# Patient Record
Sex: FEMALE | Race: WHITE | Employment: OTHER | URBAN - METROPOLITAN AREA
[De-identification: names, ages, dates, MRNs, and addresses within clinical notes are randomized per-mention and may not be internally consistent; named-entity substitution may affect disease eponyms.]

---

## 2017-02-16 ENCOUNTER — HOSPITAL ENCOUNTER (OUTPATIENT)
Dept: CT IMAGING | Age: 72
Discharge: HOME | End: 2017-02-16
Attending: UROLOGY | Admitting: UROLOGY

## 2017-02-16 DIAGNOSIS — N13.30 HYDRONEPHROSIS, UNSPECIFIED HYDRONEPHROSIS TYPE: ICD-10-CM

## 2017-02-20 ENCOUNTER — HOSPITAL ENCOUNTER (OUTPATIENT)
Dept: GENERAL RADIOLOGY | Age: 72
Discharge: HOME | End: 2017-02-20
Attending: UROLOGY | Admitting: UROLOGY

## 2017-02-20 DIAGNOSIS — N20.0 CALCULUS OF KIDNEY: ICD-10-CM

## 2017-02-20 LAB
ANION GAP SERPL CALCULATED.3IONS-SCNC: 11 MMOL/L (ref 4–16)
BUN BLDV-MCNC: 18 MG/DL (ref 6–23)
CALCIUM SERPL-MCNC: 9.6 MG/DL (ref 8.3–10.6)
CHLORIDE BLD-SCNC: 102 MMOL/L (ref 99–110)
CO2: 29 MMOL/L (ref 21–32)
CREAT SERPL-MCNC: 0.7 MG/DL (ref 0.6–1.1)
GFR AFRICAN AMERICAN: >60 ML/MIN/1.73M2
GFR NON-AFRICAN AMERICAN: >60 ML/MIN/1.73M2
GLUCOSE BLD-MCNC: 139 MG/DL (ref 70–140)
POTASSIUM SERPL-SCNC: 4.2 MMOL/L (ref 3.5–5.1)
SODIUM BLD-SCNC: 142 MMOL/L (ref 135–145)

## 2017-02-24 ENCOUNTER — HOSPITAL ENCOUNTER (OUTPATIENT)
Dept: GENERAL RADIOLOGY | Age: 72
Discharge: HOME | End: 2017-02-24

## 2017-02-24 LAB — HEMOGLOBIN: 13.4 GM/DL (ref 12.5–16)

## 2017-02-26 LAB
EKG ATRIAL RATE: 80 BPM
EKG DIAGNOSIS: NORMAL
EKG P AXIS: 52 DEGREES
EKG P-R INTERVAL: 132 MS
EKG Q-T INTERVAL: 396 MS
EKG QRS DURATION: 94 MS
EKG QTC CALCULATION (BAZETT): 456 MS
EKG R AXIS: 20 DEGREES
EKG T AXIS: 46 DEGREES
EKG VENTRICULAR RATE: 80 BPM

## 2017-03-03 ENCOUNTER — HOSPITAL ENCOUNTER (OUTPATIENT)
Dept: GENERAL RADIOLOGY | Age: 72
Discharge: HOME | End: 2017-03-03
Attending: UROLOGY | Admitting: UROLOGY

## 2017-03-03 DIAGNOSIS — N20.0 CALCULUS OF KIDNEY: ICD-10-CM

## 2018-03-29 ENCOUNTER — HOSPITAL ENCOUNTER (OUTPATIENT)
Dept: GENERAL RADIOLOGY | Age: 73
Discharge: HOME | End: 2018-03-29
Attending: UROLOGY | Admitting: UROLOGY

## 2018-03-29 DIAGNOSIS — N20.1 URETERAL STONE: ICD-10-CM

## 2018-05-17 PROBLEM — N20.0 KIDNEY STONE: Status: ACTIVE | Noted: 2018-05-17

## 2018-05-18 ENCOUNTER — NURSE ONLY (OUTPATIENT)
Dept: CARDIOLOGY CLINIC | Age: 73
End: 2018-05-18

## 2018-05-18 DIAGNOSIS — I49.9 IRREGULAR HEART BEAT: Primary | ICD-10-CM

## 2018-05-18 PROCEDURE — 93225 XTRNL ECG REC<48 HRS REC: CPT | Performed by: INTERNAL MEDICINE

## 2018-05-24 ENCOUNTER — TELEPHONE (OUTPATIENT)
Dept: CARDIOLOGY CLINIC | Age: 73
End: 2018-05-24

## 2018-05-24 PROCEDURE — 93227 XTRNL ECG REC<48 HR R&I: CPT | Performed by: INTERNAL MEDICINE

## 2018-08-08 ENCOUNTER — HOSPITAL ENCOUNTER (OUTPATIENT)
Dept: GENERAL RADIOLOGY | Age: 73
Discharge: HOME | End: 2018-08-08
Attending: UROLOGY | Admitting: UROLOGY

## 2018-08-08 DIAGNOSIS — N20.0 KIDNEY STONE: ICD-10-CM

## 2018-08-23 NOTE — ANESTHESIA PRE-OP
Fatty Acids (FISH OIL) 1200 MG CPDR Take 1,200 mg by mouth daily 05/17/18 Takes OTC    Historical Provider, MD   Cholecalciferol (VITAMIN D PO) Take 1 tablet by mouth daily 05/17/18 Takes OTC    Historical Provider, MD   Ascorbic Acid (VITAMIN C PO) Take 1 tablet by mouth daily 05/17/18 Takes OTC    Historical Provider, MD   CALCIUM CARBONATE PO Take 1 tablet by mouth daily 05/17/18 Takes OTC    Historical Provider, MD   metFORMIN (GLUCOPHAGE) 500 MG tablet Take 500 mg by mouth daily (with breakfast)    Historical Provider, MD   almotriptan (AXERT) 12.5 MG tablet Take 12.5 mg by mouth once as needed for Migraine may repeat in 2 hours if needed    Historical Provider, MD       Current medications:    Current Outpatient Prescriptions   Medication Sig Dispense Refill    Cyanocobalamin (VITAMIN B 12) 100 MCG LOZG Take by mouth daily      vitamin B-6 (PYRIDOXINE) 50 MG tablet Take 100 mg by mouth daily      Biotin 2500 MCG CAPS Take by mouth daily      aspirin 81 MG chewable tablet Take 1 tablet by mouth daily 30 tablet 0    Multiple Vitamins-Minerals (THERAPEUTIC MULTIVITAMIN-MINERALS) tablet Take 1 tablet by mouth daily 05/17/18 Takes OTC      Omega-3 Fatty Acids (FISH OIL) 1200 MG CPDR Take 1,200 mg by mouth daily 05/17/18 Takes OTC      Cholecalciferol (VITAMIN D PO) Take 1 tablet by mouth daily 05/17/18 Takes OTC      Ascorbic Acid (VITAMIN C PO) Take 1 tablet by mouth daily 05/17/18 Takes OTC      CALCIUM CARBONATE PO Take 1 tablet by mouth daily 05/17/18 Takes OTC      metFORMIN (GLUCOPHAGE) 500 MG tablet Take 500 mg by mouth daily (with breakfast)      almotriptan (AXERT) 12.5 MG tablet Take 12.5 mg by mouth once as needed for Migraine may repeat in 2 hours if needed       No current facility-administered medications for this encounter. Allergies:     Allergies   Allergen Reactions    Iodine        Problem List:    Patient Active Problem List   Diagnosis Code    Kidney stone N20.0    Intractable vomiting with nausea R11.2       Past Medical History:        Diagnosis Date    24 holter monitor 05/18/2018    Nothing significant found.  Diabetes mellitus ([address]     pre diabetic    Kidney stone        Past Surgical History:        Procedure Laterality Date    COLONOSCOPY      DILATION AND CURETTAGE OF UTERUS      ENDOSCOPY, COLON, DIAGNOSTIC      EYE SURGERY      LITHOTRIPSY         Social History:    Social History   Substance Use Topics    Smoking status: Never Smoker    Smokeless tobacco: Never Used    Alcohol use Yes      Comment: occas                                Counseling given: Not Answered      Vital Signs (Current): There were no vitals filed for this visit. BP Readings from Last 3 Encounters:   05/18/18 114/66   05/03/18 130/80       NPO Status:                                                                                 BMI:   Wt Readings from Last 3 Encounters:   08/22/18 140 lb (63.5 kg)   05/17/18 140 lb 3.2 oz (63.6 kg)   05/03/18 145 lb (65.8 kg)     There is no height or weight on file to calculate BMI. Anesthesia Evaluation  Patient summary reviewed   history of anesthetic complications: PONV. Airway: Mallampati: II  TM distance: >3 FB   Neck ROM: full  Mouth opening: > = 3 FB Dental:          Pulmonary:Negative Pulmonary ROS                              Cardiovascular:  Exercise tolerance: good (>4 METS),            Beta Blocker:  Not on Beta Blocker      ROS comment: Echo 5/2018:  Kaylee Owen   Left ventricular systolic function is normal.   Ejection fraction is visually estimated at 55-60%.   Moderate concentric left ventricular hypertrophy.   No evidence of any pericardial effusion     Neuro/Psych:   Negative Neuro/Psych ROS              GI/Hepatic/Renal:   (+) bowel prep,           Endo/Other:    (+) DiabetesType II DM, , .                 Abdominal:           Vascular: negative vascular ROS. Anesthesia Plan      general and TIVA     ASA 2       Induction: intravenous. Anesthetic plan and risks discussed with patient. DEE Gleason - CRNA   8/23/2018       Pre Anesthesia Assessment complete.  Chart reviewed on 8/23/2018

## 2018-08-24 ENCOUNTER — HOSPITAL ENCOUNTER (OUTPATIENT)
Dept: SURGERY | Age: 73
Discharge: HOME | End: 2018-08-24
Attending: SPECIALIST | Admitting: SPECIALIST

## 2018-08-24 VITALS
RESPIRATION RATE: 16 BRPM | TEMPERATURE: 97.5 F | HEIGHT: 67 IN | HEART RATE: 63 BPM | OXYGEN SATURATION: 100 % | DIASTOLIC BLOOD PRESSURE: 79 MMHG | WEIGHT: 137 LBS | SYSTOLIC BLOOD PRESSURE: 123 MMHG | BODY MASS INDEX: 21.5 KG/M2

## 2018-08-24 LAB — GLUCOSE BLD-MCNC: 118 MG/DL (ref 70–99)

## 2018-08-24 RX ORDER — SODIUM CHLORIDE, SODIUM LACTATE, POTASSIUM CHLORIDE, CALCIUM CHLORIDE 600; 310; 30; 20 MG/100ML; MG/100ML; MG/100ML; MG/100ML
INJECTION, SOLUTION INTRAVENOUS CONTINUOUS
Status: DISCONTINUED | OUTPATIENT
Start: 2018-08-24 | End: 2018-08-25 | Stop reason: HOSPADM

## 2018-08-24 RX ORDER — PROMETHAZINE HYDROCHLORIDE 25 MG/ML
6.25 INJECTION, SOLUTION INTRAMUSCULAR; INTRAVENOUS
Status: ACTIVE | OUTPATIENT
Start: 2018-08-24 | End: 2018-08-24

## 2018-08-24 RX ADMIN — SODIUM CHLORIDE, SODIUM LACTATE, POTASSIUM CHLORIDE, CALCIUM CHLORIDE: 600; 310; 30; 20 INJECTION, SOLUTION INTRAVENOUS at 09:53

## 2018-08-24 ASSESSMENT — PAIN - FUNCTIONAL ASSESSMENT: PAIN_FUNCTIONAL_ASSESSMENT: 0-10

## 2018-08-24 ASSESSMENT — PAIN SCALES - GENERAL
PAINLEVEL_OUTOF10: 0
PAINLEVEL_OUTOF10: 0

## 2018-08-24 NOTE — PROGRESS NOTES
1102 Patient transferred from GI lab to [facility] via cart, her  is at bedside. Dr Radha Barlow in to talk to patient and her .

## 2018-08-24 NOTE — BRIEF OP NOTE
BRIEF COLONOSCOPY REPORT:    Impression:    1) 6 small polyps removed    2) small internal hemorrhoids        Suggest:   1)The interval for repeat colonoscopy will be determined by the path report. The complete operative report (including photos) is available in the following locations:   1) soft chart now   2) report will also be scanned and can then be found by going to \"chart review\" then \"notes\" then \"op report\" or by going to \"chart review\" then \"media\" then \"op report\". For review of photos, may need to go to page 2.

## 2018-11-12 ENCOUNTER — HOSPITAL ENCOUNTER (OUTPATIENT)
Dept: CT IMAGING | Age: 73
Discharge: HOME | End: 2018-11-12
Payer: MEDICARE

## 2018-11-12 DIAGNOSIS — R91.1 COIN LESION OF LUNG: ICD-10-CM

## 2018-11-12 LAB — POC CREATININE: 0.9 MG/DL (ref 0.6–1.1)

## 2018-11-12 PROCEDURE — 6360000004 HC RX CONTRAST MEDICATION: Performed by: EMERGENCY MEDICINE

## 2018-11-12 PROCEDURE — 71260 CT THORAX DX C+: CPT

## 2018-11-12 PROCEDURE — 2580000003 HC RX 258: Performed by: EMERGENCY MEDICINE

## 2018-11-12 RX ORDER — 0.9 % SODIUM CHLORIDE 0.9 %
10 VIAL (ML) INJECTION PRN
Status: DISCONTINUED | OUTPATIENT
Start: 2018-11-12 | End: 2018-11-13 | Stop reason: HOSPADM

## 2018-11-12 RX ADMIN — IOPAMIDOL 80 ML: 755 INJECTION, SOLUTION INTRAVENOUS at 10:48

## 2018-11-12 RX ADMIN — Medication 10 ML: at 10:48

## 2018-11-26 LAB
AVERAGE GLUCOSE: NORMAL
HBA1C MFR BLD: 5.9 %

## 2018-12-11 ENCOUNTER — APPOINTMENT (OUTPATIENT)
Dept: GENERAL RADIOLOGY | Age: 73
End: 2018-12-11
Payer: MEDICARE

## 2018-12-11 ENCOUNTER — HOSPITAL ENCOUNTER (EMERGENCY)
Age: 73
Discharge: HOME | End: 2018-12-11
Payer: MEDICARE

## 2018-12-11 VITALS
DIASTOLIC BLOOD PRESSURE: 86 MMHG | BODY MASS INDEX: 23.3 KG/M2 | OXYGEN SATURATION: 100 % | RESPIRATION RATE: 15 BRPM | SYSTOLIC BLOOD PRESSURE: 131 MMHG | TEMPERATURE: 98 F | HEART RATE: 85 BPM | HEIGHT: 66 IN | WEIGHT: 145 LBS

## 2018-12-11 DIAGNOSIS — S62.115A CLOSED NONDISPLACED FRACTURE OF TRIQUETRUM OF LEFT WRIST, INITIAL ENCOUNTER: Primary | ICD-10-CM

## 2018-12-11 DIAGNOSIS — S92.344A CLOSED NONDISPLACED FRACTURE OF FOURTH METATARSAL BONE OF RIGHT FOOT, INITIAL ENCOUNTER: ICD-10-CM

## 2018-12-11 PROCEDURE — 4500000028 HC INTERMEDIATE PROCEDURE

## 2018-12-11 PROCEDURE — 73110 X-RAY EXAM OF WRIST: CPT

## 2018-12-11 PROCEDURE — 73630 X-RAY EXAM OF FOOT: CPT

## 2018-12-11 PROCEDURE — 99284 EMERGENCY DEPT VISIT MOD MDM: CPT

## 2018-12-11 ASSESSMENT — PAIN SCALES - GENERAL: PAINLEVEL_OUTOF10: 4

## 2018-12-11 ASSESSMENT — PAIN DESCRIPTION - PAIN TYPE
TYPE: ACUTE PAIN
TYPE: ACUTE PAIN

## 2018-12-11 ASSESSMENT — PAIN DESCRIPTION - DESCRIPTORS: DESCRIPTORS: ACHING

## 2018-12-11 ASSESSMENT — PAIN DESCRIPTION - ORIENTATION
ORIENTATION: LEFT;RIGHT
ORIENTATION: LEFT

## 2018-12-11 ASSESSMENT — PAIN DESCRIPTION - LOCATION
LOCATION: WRIST
LOCATION: WRIST;KNEE

## 2018-12-11 ASSESSMENT — PAIN DESCRIPTION - FREQUENCY: FREQUENCY: CONTINUOUS

## 2018-12-11 NOTE — ED PROVIDER NOTES
hemorrhoids, 3mm polyp in sigmoid colon, 6 mm polyp in cecum and proximal ascending colon, 2 endoclips placed, 2 3mm polyps in sigmoid, 3 mm polyp in mid ascending colon    DILATION AND CURETTAGE OF UTERUS      ENDOSCOPY, COLON, DIAGNOSTIC      EYE SURGERY      LITHOTRIPSY         CURRENT MEDICATIONS    Current Outpatient Rx   Medication Sig Dispense Refill    Cyanocobalamin (VITAMIN B 12) 100 MCG LOZG Take by mouth nightly       vitamin B-6 (PYRIDOXINE) 50 MG tablet Take 100 mg by mouth daily      Biotin 2500 MCG CAPS Take by mouth nightly       Multiple Vitamins-Minerals (THERAPEUTIC MULTIVITAMIN-MINERALS) tablet Take 1 tablet by mouth nightly 05/17/18 Takes OTC      Omega-3 Fatty Acids (FISH OIL) 1200 MG CPDR Take 1,200 mg by mouth nightly 05/17/18 Takes OTC      Cholecalciferol (VITAMIN D PO) Take 1 tablet by mouth nightly 05/17/18 Takes OTC      Ascorbic Acid (VITAMIN C PO) Take 1 tablet by mouth nightly 05/17/18 Takes OTC      CALCIUM CARBONATE PO Take 1 tablet by mouth nightly 05/17/18 Takes OTC      metFORMIN (GLUCOPHAGE) 500 MG tablet Take 500 mg by mouth daily (with breakfast)      almotriptan (AXERT) 12.5 MG tablet Take 12.5 mg by mouth once as needed for Migraine may repeat in 2 hours if needed         ALLERGIES    Allergies   Allergen Reactions    Iodine     Morphine Other (See Comments)     Put pt into afib       SOCIAL & FAMILY HISTORY    Social History     Social History    Marital status:      Spouse name: N/A    Number of children: N/A    Years of education: N/A     Social History Main Topics    Smoking status: Never Smoker    Smokeless tobacco: Never Used    Alcohol use Yes      Comment: occas    Drug use: No    Sexual activity: Not Asked     Other Topics Concern    None     Social History Narrative    None     History reviewed. No pertinent family history.         PHYSICAL EXAM    VITAL SIGNS: /86   Pulse 85   Temp 98 °F (36.7 °C) (Oral)   Resp 15   Ht 5'

## 2019-04-27 DIAGNOSIS — M80.80XS LOCALIZED OSTEOPOROSIS WITH CURRENT PATHOLOGICAL FRACTURE, SEQUELA: ICD-10-CM

## 2019-04-27 DIAGNOSIS — G43.909 MIGRAINE WITHOUT STATUS MIGRAINOSUS, NOT INTRACTABLE, UNSPECIFIED MIGRAINE TYPE: ICD-10-CM

## 2019-04-27 DIAGNOSIS — H33.20 RETINAL DETACHMENT, UNSPECIFIED LATERALITY: ICD-10-CM

## 2019-04-27 DIAGNOSIS — I10 ESSENTIAL HYPERTENSION: ICD-10-CM

## 2019-04-27 DIAGNOSIS — E78.5 HYPERLIPIDEMIA, UNSPECIFIED HYPERLIPIDEMIA TYPE: ICD-10-CM

## 2019-04-27 PROBLEM — M81.0 OSTEOPOROSIS: Status: ACTIVE | Noted: 2019-04-27

## 2019-04-27 RX ORDER — METFORMIN HYDROCHLORIDE 500 MG/1
500 TABLET, EXTENDED RELEASE ORAL DAILY
COMMUNITY
End: 2019-05-21 | Stop reason: SDUPTHER

## 2019-05-21 ENCOUNTER — OFFICE VISIT (OUTPATIENT)
Dept: FAMILY MEDICINE CLINIC | Age: 74
End: 2019-05-21
Payer: MEDICARE

## 2019-05-21 VITALS
SYSTOLIC BLOOD PRESSURE: 112 MMHG | BODY MASS INDEX: 22.19 KG/M2 | HEART RATE: 72 BPM | HEIGHT: 67 IN | WEIGHT: 141.4 LBS | DIASTOLIC BLOOD PRESSURE: 78 MMHG

## 2019-05-21 DIAGNOSIS — R73.03 PRE-DIABETES: ICD-10-CM

## 2019-05-21 DIAGNOSIS — R73.03 PRE-DIABETES: Primary | ICD-10-CM

## 2019-05-21 DIAGNOSIS — L71.9 ROSACEA, ACNE: ICD-10-CM

## 2019-05-21 DIAGNOSIS — Z78.0 POST-MENOPAUSE: ICD-10-CM

## 2019-05-21 DIAGNOSIS — Z12.39 SCREENING BREAST EXAMINATION: ICD-10-CM

## 2019-05-21 DIAGNOSIS — I10 ESSENTIAL HYPERTENSION: ICD-10-CM

## 2019-05-21 PROCEDURE — 99214 OFFICE O/P EST MOD 30 MIN: CPT | Performed by: FAMILY MEDICINE

## 2019-05-21 RX ORDER — METFORMIN HYDROCHLORIDE 500 MG/1
TABLET, EXTENDED RELEASE ORAL
Qty: 180 TABLET | Refills: 1 | Status: SHIPPED | OUTPATIENT
Start: 2019-05-21 | End: 2019-11-23 | Stop reason: SDUPTHER

## 2019-05-21 ASSESSMENT — ENCOUNTER SYMPTOMS
ABDOMINAL PAIN: 0
COUGH: 0
SINUS PRESSURE: 0
ALLERGIC/IMMUNOLOGIC NEGATIVE: 1
CONSTIPATION: 0
DIARRHEA: 0
CHEST TIGHTNESS: 0
RHINORRHEA: 0
RESPIRATORY NEGATIVE: 1
SORE THROAT: 0
SHORTNESS OF BREATH: 0

## 2019-05-21 ASSESSMENT — PATIENT HEALTH QUESTIONNAIRE - PHQ9
1. LITTLE INTEREST OR PLEASURE IN DOING THINGS: 0
SUM OF ALL RESPONSES TO PHQ QUESTIONS 1-9: 0
SUM OF ALL RESPONSES TO PHQ9 QUESTIONS 1 & 2: 0
2. FEELING DOWN, DEPRESSED OR HOPELESS: 0
SUM OF ALL RESPONSES TO PHQ QUESTIONS 1-9: 0

## 2019-05-21 NOTE — PROGRESS NOTES
5/21/2019    Félix Veloz    Chief Complaint   Patient presents with    6 Month Follow-Up    Other     - possible lactose intolerance per pt. gi upset post eating dairy       HPI  History was obtained from patient. Nancy Wilson is a 68 y.o. female who presents today to follow up after 6 months. She is due for a Mammogram and Bone density. Orders will be provided to her today. She has a lump on her right side lower back that has been seen by a dermatologist and states it is on the bone and needs an x-ray. It hasn't changed in size at all. She states the sleep aid she was given works well and she only has to use it maybe once a month. Nancy Wilson complains that she gets GI upset after eating dairy products. This is not something constant or very bothersome. Nancy Wilson noted pain on her right flank that has been there intermittently but persistently for a few years. Patient locates the pain over the distal end of her floating rib. There are no skin changes over    REVIEW OF SYMPTOMS    Review of Systems   Constitutional: Negative for chills and fever. HENT: Negative. Negative for rhinorrhea, sinus pressure and sore throat. Respiratory: Negative. Negative for cough, chest tightness and shortness of breath. Cardiovascular: Negative. Gastrointestinal: Negative for abdominal pain, constipation and diarrhea. GI upset after eating dairy   Endocrine: Negative. Genitourinary: Negative. Negative for frequency. Musculoskeletal: Negative for myalgias. Skin:        Lump/spot on lower right side lower back. Watching for change in size. Allergic/Immunologic: Negative. Neurological: Negative. Hematological: Negative. Psychiatric/Behavioral: Negative for sleep disturbance (sleep aid works well). Mild tenderness to palpation over the distal end of her right sided floating rib. PAST MEDICAL HISTORY  Past Medical History:   Diagnosis Date    24 holter monitor 05/18/2018    Nothing significant found.  Diabetes mellitus ([facility]     pre diabetic    Hyperlipidemia     Hypertension     Kidney stone     PONV (postoperative nausea and vomiting)        FAMILY HISTORY  No family history on file.     SOCIAL HISTORY  Social History     Socioeconomic History    Marital status:      Spouse name: None    Number of children: None    Years of education: None    Highest education level: None   Occupational History    None   Social Needs    Financial resource strain: None    Food insecurity:     Worry: None     Inability: None    Transportation needs:     Medical: None     Non-medical: None   Tobacco Use    Smoking status: Never Smoker    Smokeless tobacco: Never Used   Substance and Sexual Activity    Alcohol use: Yes     Comment: occas    Drug use: No    Sexual activity: None   Lifestyle    Physical activity:     Days per week: None     Minutes per session: None    Stress: None   Relationships    Social connections:     Talks on phone: None     Gets together: None     Attends Nondenominational service: None     Active member of club or organization: None     Attends meetings of clubs or organizations: None     Relationship status: None    Intimate partner violence:     Fear of current or ex partner: None     Emotionally abused: None     Physically abused: None     Forced sexual activity: None   Other Topics Concern    None   Social History Narrative    None        SURGICAL HISTORY  Past Surgical History:   Procedure Laterality Date    COLONOSCOPY  08/24/2018    internal grade 1 hemorrhoids, 3mm polyp in sigmoid colon, 6 mm polyp in cecum and proximal ascending colon, 2 endoclips placed, 2 3mm polyps in sigmoid, 3 mm polyp in mid ascending colon    DILATION AND CURETTAGE OF UTERUS      ENDOSCOPY, COLON, DIAGNOSTIC      EYE SURGERY      LITHOTRIPSY         CURRENT MEDICATIONS  Current Outpatient Medications   Medication Sig Dispense Refill    metFORMIN (GLUCOPHAGE-XR) 500 MG extended release tablet Take 500 mg by mouth daily TAKE 2 QD      Calcium Carb-Cholecalciferol (CALCIUM 600 + D PO) Take by mouth TAKE 1 3DAYS PER WEEK      Cyanocobalamin (VITAMIN B 12) 100 MCG LOZG Take by mouth nightly       vitamin B-6 (PYRIDOXINE) 50 MG tablet Take 100 mg by mouth daily      Biotin 2500 MCG CAPS Take by mouth nightly       Multiple Vitamins-Minerals (THERAPEUTIC MULTIVITAMIN-MINERALS) tablet Take 1 tablet by mouth nightly 05/17/18 Takes OTC      Omega-3 Fatty Acids (FISH OIL) 1200 MG CPDR Take 1,200 mg by mouth nightly 05/17/18 Takes OTC      Cholecalciferol (VITAMIN D PO) Take 1 tablet by mouth nightly 05/17/18 Takes OTC      Ascorbic Acid (VITAMIN C PO) Take 1 tablet by mouth nightly 05/17/18 Takes OTC      almotriptan (AXERT) 12.5 MG tablet Take 12.5 mg by mouth once as needed for Migraine may repeat in 2 hours if needed       No current facility-administered medications for this visit. ALLERGIES  Allergies   Allergen Reactions    Bactrim [Sulfamethoxazole-Trimethoprim]     Iodine     Minocin [Minocycline Hcl]      HEADACHE    Morphine Other (See Comments)     Put pt into afib       PHYSICAL EXAM    /78   Pulse 72   Ht 5' 7\" (1.702 m)   Wt 141 lb 6.4 oz (64.1 kg)   BMI 22.15 kg/m²     Physical Exam   Constitutional: She is oriented to person, place, and time. She appears well-developed and well-nourished. No distress. HENT:   Head: Normocephalic. Right Ear: External ear normal.   Left Ear: External ear normal.   Eyes: Pupils are equal, round, and reactive to light. Conjunctivae and EOM are normal.   Neck: Normal range of motion. Neck supple. Cardiovascular: Normal rate, regular rhythm and normal heart sounds. Exam reveals no gallop and no friction rub. No murmur heard. Heart sounds regular and not fast   Pulmonary/Chest: Effort normal and breath sounds normal. No stridor. No respiratory distress. She has no wheezes. She has no rales. She exhibits no tenderness.    Lungs are

## 2019-05-22 LAB
ESTIMATED AVERAGE GLUCOSE: 119.8 MG/DL
HBA1C MFR BLD: 5.8 %

## 2019-06-06 ENCOUNTER — HOSPITAL ENCOUNTER (OUTPATIENT)
Dept: WOMENS IMAGING | Age: 74
Discharge: HOME | End: 2019-06-06
Payer: MEDICARE

## 2019-06-06 ENCOUNTER — TELEPHONE (OUTPATIENT)
Dept: FAMILY MEDICINE CLINIC | Age: 74
End: 2019-06-06

## 2019-06-06 DIAGNOSIS — Z12.31 SCREENING MAMMOGRAM, ENCOUNTER FOR: ICD-10-CM

## 2019-06-06 DIAGNOSIS — Z78.0 POST-MENOPAUSE: ICD-10-CM

## 2019-06-06 PROCEDURE — 77080 DXA BONE DENSITY AXIAL: CPT

## 2019-06-06 PROCEDURE — 77063 BREAST TOMOSYNTHESIS BI: CPT

## 2019-06-06 NOTE — TELEPHONE ENCOUNTER
----- Message from Isabella Youssef PA-C sent at 6/6/2019  1:27 PM EDT -----  DEXA shows osteopenia, cont on Vit D and Calcium and be sure to do weight bearing exercises.

## 2019-06-06 NOTE — TELEPHONE ENCOUNTER
SPOKE WITH PT PER DR GRIJALVA NOTE.  PT VOICED UNDERSTANDING  Electronically signed by Cheko Razo LPN on 3/0/2501 at 1:31 PM

## 2019-06-19 ENCOUNTER — TELEPHONE (OUTPATIENT)
Dept: FAMILY MEDICINE CLINIC | Age: 74
End: 2019-06-19

## 2019-06-19 DIAGNOSIS — R92.8 ABNORMAL MAMMOGRAM OF BOTH BREASTS: Primary | ICD-10-CM

## 2019-06-19 NOTE — TELEPHONE ENCOUNTER
----- Message from Jessica Hawkins sent at 6/19/2019  4:18 PM EDT -----  Contact: PATIENT  SOMEONE CALLED FROM HER AND RATTLED OFF A BUNCH OF THINGS THAT WERE GOING ON WITH BREAST. WANTS A COPY OF MAMMO REPORT FAXED TO HOME. IN [place] NOW.    FAX# [phone] pt [phone]

## 2019-06-24 ENCOUNTER — HOSPITAL ENCOUNTER (OUTPATIENT)
Dept: ULTRASOUND IMAGING | Age: 74
Discharge: HOME | End: 2019-06-24
Payer: MEDICARE

## 2019-06-24 DIAGNOSIS — R92.8 ABNORMAL MAMMOGRAM OF BOTH BREASTS: ICD-10-CM

## 2019-06-24 PROCEDURE — 76642 ULTRASOUND BREAST LIMITED: CPT

## 2019-06-25 ENCOUNTER — TELEPHONE (OUTPATIENT)
Dept: FAMILY MEDICINE CLINIC | Age: 74
End: 2019-06-25

## 2019-06-26 ENCOUNTER — TELEPHONE (OUTPATIENT)
Dept: FAMILY MEDICINE CLINIC | Age: 74
End: 2019-06-26

## 2019-06-26 NOTE — TELEPHONE ENCOUNTER
PT CALLED WANTING TO KNOW WHEN LAST U/S AFTER MAMMOGRAM WAS AS SHE HAD IT AT [facility] AND THEY CLOSED, PER CHART NO U/S IN RECORD BUT MAMMO IN 2014 WAS ORDERED BY DR GARCÍA CAN CALL THERE AND SEE IF THEY HAVE IT. ADVISED PT, SHE VOICED UNDERSTANDING.   Electronically signed by Leela Zabala MA on 6/26/2019 at 9:56 AM

## 2019-08-30 ENCOUNTER — OFFICE VISIT (OUTPATIENT)
Dept: FAMILY MEDICINE CLINIC | Age: 74
End: 2019-08-30
Payer: MEDICARE

## 2019-08-30 VITALS
TEMPERATURE: 98.3 F | BODY MASS INDEX: 21.56 KG/M2 | OXYGEN SATURATION: 97 % | HEIGHT: 67 IN | SYSTOLIC BLOOD PRESSURE: 116 MMHG | DIASTOLIC BLOOD PRESSURE: 80 MMHG | WEIGHT: 137.4 LBS | HEART RATE: 78 BPM

## 2019-08-30 DIAGNOSIS — I10 ESSENTIAL HYPERTENSION: Primary | ICD-10-CM

## 2019-08-30 DIAGNOSIS — M79.645 FINGER PAIN, LEFT: ICD-10-CM

## 2019-08-30 DIAGNOSIS — R09.81 SINUS CONGESTION: ICD-10-CM

## 2019-08-30 PROCEDURE — 99214 OFFICE O/P EST MOD 30 MIN: CPT | Performed by: FAMILY MEDICINE

## 2019-08-30 RX ORDER — MONTELUKAST SODIUM 10 MG/1
10 TABLET ORAL DAILY
Qty: 30 TABLET | Refills: 3 | Status: SHIPPED | OUTPATIENT
Start: 2019-08-30 | End: 2019-11-22

## 2019-08-30 RX ORDER — LORATADINE 10 MG/1
10 TABLET ORAL DAILY
Qty: 30 TABLET | Refills: 5 | Status: SHIPPED | OUTPATIENT
Start: 2019-08-30 | End: 2019-11-22

## 2019-08-30 ASSESSMENT — ENCOUNTER SYMPTOMS
ABDOMINAL PAIN: 0
SINUS PRESSURE: 0
SHORTNESS OF BREATH: 0
COUGH: 0
SORE THROAT: 0
CHEST TIGHTNESS: 0
RHINORRHEA: 0
DIARRHEA: 0
CONSTIPATION: 0

## 2019-09-09 ENCOUNTER — TELEPHONE (OUTPATIENT)
Dept: FAMILY MEDICINE CLINIC | Age: 74
End: 2019-09-09

## 2019-09-09 DIAGNOSIS — J30.9 ALLERGIC RHINITIS, UNSPECIFIED SEASONALITY, UNSPECIFIED TRIGGER: Primary | ICD-10-CM

## 2019-09-26 DIAGNOSIS — F41.9 ANXIETY: Primary | ICD-10-CM

## 2019-09-27 RX ORDER — LORAZEPAM 0.5 MG/1
TABLET ORAL
Qty: 10 TABLET | Refills: 0 | Status: SHIPPED | OUTPATIENT
Start: 2019-09-27 | End: 2019-10-27

## 2019-10-10 ENCOUNTER — TELEPHONE (OUTPATIENT)
Dept: FAMILY MEDICINE CLINIC | Age: 74
End: 2019-10-10

## 2019-10-11 ENCOUNTER — HOSPITAL ENCOUNTER (OUTPATIENT)
Age: 74
Discharge: HOME | End: 2019-10-11
Payer: MEDICARE

## 2019-10-11 ENCOUNTER — HOSPITAL ENCOUNTER (OUTPATIENT)
Dept: GENERAL RADIOLOGY | Age: 74
Discharge: HOME | End: 2019-10-11
Payer: MEDICARE

## 2019-10-11 DIAGNOSIS — M79.645 FINGER PAIN, LEFT: ICD-10-CM

## 2019-10-11 DIAGNOSIS — M79.645 FINGER PAIN, LEFT: Primary | ICD-10-CM

## 2019-10-11 PROCEDURE — 73140 X-RAY EXAM OF FINGER(S): CPT

## 2019-11-22 ENCOUNTER — OFFICE VISIT (OUTPATIENT)
Dept: FAMILY MEDICINE CLINIC | Age: 74
End: 2019-11-22
Payer: MEDICARE

## 2019-11-22 VITALS
DIASTOLIC BLOOD PRESSURE: 66 MMHG | HEART RATE: 82 BPM | BODY MASS INDEX: 21.99 KG/M2 | SYSTOLIC BLOOD PRESSURE: 118 MMHG | RESPIRATION RATE: 16 BRPM | WEIGHT: 140.4 LBS

## 2019-11-22 DIAGNOSIS — J30.2 SEASONAL ALLERGIC RHINITIS, UNSPECIFIED TRIGGER: ICD-10-CM

## 2019-11-22 DIAGNOSIS — R73.03 PRE-DIABETES: ICD-10-CM

## 2019-11-22 DIAGNOSIS — I10 ESSENTIAL HYPERTENSION: Primary | ICD-10-CM

## 2019-11-22 DIAGNOSIS — E78.00 PURE HYPERCHOLESTEROLEMIA: ICD-10-CM

## 2019-11-22 PROCEDURE — 99214 OFFICE O/P EST MOD 30 MIN: CPT | Performed by: FAMILY MEDICINE

## 2019-11-22 RX ORDER — METRONIDAZOLE 10 MG/G
GEL TOPICAL DAILY
COMMUNITY

## 2019-11-23 RX ORDER — METFORMIN HYDROCHLORIDE 500 MG/1
TABLET, EXTENDED RELEASE ORAL
Qty: 180 TABLET | Refills: 1 | Status: SHIPPED | OUTPATIENT
Start: 2019-11-23 | End: 2020-05-21 | Stop reason: SDUPTHER

## 2019-11-23 ASSESSMENT — ENCOUNTER SYMPTOMS
COUGH: 0
SINUS PRESSURE: 0
SHORTNESS OF BREATH: 0
SORE THROAT: 0
CONSTIPATION: 0
DIARRHEA: 0
RHINORRHEA: 0
CHEST TIGHTNESS: 0
ABDOMINAL PAIN: 0

## 2020-01-03 ENCOUNTER — HOSPITAL ENCOUNTER (OUTPATIENT)
Dept: ULTRASOUND IMAGING | Age: 75
Discharge: HOME | End: 2020-01-03
Payer: MEDICARE

## 2020-01-03 PROCEDURE — 76642 ULTRASOUND BREAST LIMITED: CPT

## 2020-02-28 ENCOUNTER — TELEPHONE (OUTPATIENT)
Dept: FAMILY MEDICINE CLINIC | Age: 75
End: 2020-02-28

## 2020-02-28 NOTE — TELEPHONE ENCOUNTER
Vomiting since 2 am and is happening ever hour    Pt took promethazine at 6 am (old and out of date) and it did not     Pt is not sure what she would do and wanted the doc opinion

## 2020-05-21 ENCOUNTER — TELEMEDICINE (OUTPATIENT)
Dept: FAMILY MEDICINE CLINIC | Age: 75
End: 2020-05-21
Payer: MEDICARE

## 2020-05-21 ENCOUNTER — VIRTUAL VISIT (OUTPATIENT)
Dept: FAMILY MEDICINE CLINIC | Age: 75
End: 2020-05-21

## 2020-05-21 PROCEDURE — 99442 PR PHYS/QHP TELEPHONE EVALUATION 11-20 MIN: CPT | Performed by: FAMILY MEDICINE

## 2020-05-21 ASSESSMENT — PATIENT HEALTH QUESTIONNAIRE - PHQ9
2. FEELING DOWN, DEPRESSED OR HOPELESS: 0
1. LITTLE INTEREST OR PLEASURE IN DOING THINGS: 0
SUM OF ALL RESPONSES TO PHQ QUESTIONS 1-9: 0
SUM OF ALL RESPONSES TO PHQ9 QUESTIONS 1 & 2: 0
SUM OF ALL RESPONSES TO PHQ QUESTIONS 1-9: 0

## 2020-05-22 DIAGNOSIS — E78.00 PURE HYPERCHOLESTEROLEMIA: ICD-10-CM

## 2020-05-22 DIAGNOSIS — R73.01 IFG (IMPAIRED FASTING GLUCOSE): ICD-10-CM

## 2020-05-22 LAB
CHOLESTEROL, TOTAL: 197 MG/DL (ref 0–199)
ESTIMATED AVERAGE GLUCOSE: 122.6 MG/DL
HBA1C MFR BLD: 5.9 %
HDLC SERPL-MCNC: 42 MG/DL (ref 40–60)
LDL CHOLESTEROL CALCULATED: 116 MG/DL
TRIGL SERPL-MCNC: 196 MG/DL (ref 0–150)
VLDLC SERPL CALC-MCNC: 39 MG/DL

## 2020-05-22 RX ORDER — METFORMIN HYDROCHLORIDE 500 MG/1
TABLET, EXTENDED RELEASE ORAL
Qty: 180 TABLET | Refills: 1 | Status: SHIPPED | OUTPATIENT
Start: 2020-05-22 | End: 2020-10-13

## 2020-05-26 ENCOUNTER — HOSPITAL ENCOUNTER (OUTPATIENT)
Age: 75
Discharge: HOME | End: 2020-05-26
Payer: MEDICARE

## 2020-05-26 ENCOUNTER — HOSPITAL ENCOUNTER (OUTPATIENT)
Dept: GENERAL RADIOLOGY | Age: 75
Discharge: HOME | End: 2020-05-26
Payer: MEDICARE

## 2020-05-26 PROCEDURE — 74018 RADEX ABDOMEN 1 VIEW: CPT

## 2020-09-02 ENCOUNTER — TELEPHONE (OUTPATIENT)
Dept: FAMILY MEDICINE CLINIC | Age: 75
End: 2020-09-02

## 2020-09-02 NOTE — TELEPHONE ENCOUNTER
Needs order for mammogram and it should be the one for woman with dense breast.     Please send to [facility]

## 2020-09-04 ENCOUNTER — HOSPITAL ENCOUNTER (OUTPATIENT)
Dept: WOMENS IMAGING | Age: 75
Discharge: HOME | End: 2020-09-04
Payer: MEDICARE

## 2020-09-04 PROCEDURE — 77063 BREAST TOMOSYNTHESIS BI: CPT

## 2020-10-12 ENCOUNTER — TELEPHONE (OUTPATIENT)
Dept: FAMILY MEDICINE CLINIC | Age: 75
End: 2020-10-12

## 2020-10-12 NOTE — TELEPHONE ENCOUNTER
Guerita please give patient to [employer] for metformin. You can print one for metformin 500 mg 1 pill twice a day with meals #60 refill 1. Call in a prescription for Metformin 500 mg 1 pill twice a day #60 refill 1. Asked patient to stop using her Metformin ER. They put a recall on that version. Tell patient we are starting to see her leave. Is this a permanent move?

## 2020-10-12 NOTE — TELEPHONE ENCOUNTER
Patient needs a \"paper\" prescription for Metformin ----she is moving to [place] ---she is moving there on Friday, October 16th. Could you please call her when this is ready to be picked up.  Phone:  [phone]

## 2020-10-13 NOTE — TELEPHONE ENCOUNTER
Spoke with pt per dr Peck Points note.  Pt agreed & voiced understanding  Requested Prescriptions     Signed Prescriptions Disp Refills    metFORMIN (GLUCOPHAGE) 500 MG tablet 60 tablet 0     Sig: Take 1 tablet by mouth 2 times daily (with meals)     Authorizing Provider: Forest Wilcox     Ordering User: Piotr Croft

## 2020-11-02 ENCOUNTER — TELEPHONE (OUTPATIENT)
Dept: FAMILY MEDICINE CLINIC | Age: 75
End: 2020-11-02